# Patient Record
Sex: FEMALE | Race: WHITE
[De-identification: names, ages, dates, MRNs, and addresses within clinical notes are randomized per-mention and may not be internally consistent; named-entity substitution may affect disease eponyms.]

---

## 2018-02-23 ENCOUNTER — HOSPITAL ENCOUNTER (OUTPATIENT)
Dept: HOSPITAL 4 - SMA | Age: 58
Discharge: HOME | End: 2018-02-23
Attending: FAMILY MEDICINE
Payer: COMMERCIAL

## 2018-02-23 DIAGNOSIS — Z12.31: Primary | ICD-10-CM

## 2019-11-20 ENCOUNTER — HOSPITAL ENCOUNTER (OUTPATIENT)
Dept: HOSPITAL 4 - SMA | Age: 59
Discharge: HOME | End: 2019-11-20
Attending: FAMILY MEDICINE
Payer: COMMERCIAL

## 2019-11-20 DIAGNOSIS — Z12.31: Primary | ICD-10-CM

## 2021-07-07 ENCOUNTER — OFFICE (OUTPATIENT)
Dept: URBAN - METROPOLITAN AREA CLINIC 13 | Facility: CLINIC | Age: 61
End: 2021-07-07

## 2021-07-07 VITALS — HEIGHT: 63 IN | WEIGHT: 220 LBS

## 2021-07-07 DIAGNOSIS — K86.9 DISORDER OF PANCREAS: ICD-10-CM

## 2021-07-07 DIAGNOSIS — K83.1 BILIARY STRICTURE: ICD-10-CM

## 2021-07-07 PROCEDURE — 99204 OFFICE O/P NEW MOD 45 MIN: CPT | Performed by: INTERNAL MEDICINE

## 2021-07-07 PROCEDURE — G0406 INPT/TELE FOLLOW UP 15: HCPCS | Performed by: INTERNAL MEDICINE

## 2021-07-07 NOTE — SERVICEHPINOTES
She underwent ERCP 6/9/2021 and underwent   10mm x 6 cm fully covered metal biliary stent placement. She went to see Dr. Romero. regarding the biliary stricture. Lost 40lbs since Jan 2021. She started to feel sick since Jan 2021. She thought that it was related to statin. She went to primary care physician and had blood work. She went to the ED for abnormal liver chemistries. She underwent first ERCP 4/28/2021 and plastic stent first. She had brushing in 4/2021 and the result was benign. She went back to ER again on 6/2021 due to worsening jaundice, chills, vomiting, and abdominal pain. She still has vomiting once or twice a week. No prior abdominal surgery.